# Patient Record
Sex: FEMALE | URBAN - METROPOLITAN AREA
[De-identification: names, ages, dates, MRNs, and addresses within clinical notes are randomized per-mention and may not be internally consistent; named-entity substitution may affect disease eponyms.]

---

## 2020-09-18 ENCOUNTER — OFFICE VISIT (OUTPATIENT)
Dept: URBAN - METROPOLITAN AREA CLINIC 76 | Facility: CLINIC | Age: 60
End: 2020-09-18
Payer: COMMERCIAL

## 2020-09-18 DIAGNOSIS — H04.123 DRY EYE SYNDROME OF BILATERAL LACRIMAL GLANDS: ICD-10-CM

## 2020-09-18 DIAGNOSIS — H25.13 AGE-RELATED NUCLEAR CATARACT, BILATERAL: ICD-10-CM

## 2020-09-18 DIAGNOSIS — H52.4 PRESBYOPIA: ICD-10-CM

## 2020-09-18 DIAGNOSIS — H43.813 VITREOUS DEGENERATION, BILATERAL: Primary | ICD-10-CM

## 2020-09-18 DIAGNOSIS — H10.45 OTHER CHRONIC ALLERGIC CONJUNCTIVITIS: ICD-10-CM

## 2020-09-18 PROCEDURE — 99204 OFFICE O/P NEW MOD 45 MIN: CPT | Performed by: OPTOMETRIST

## 2020-09-18 ASSESSMENT — INTRAOCULAR PRESSURE
OS: 15
OD: 14

## 2020-09-18 ASSESSMENT — KERATOMETRY
OD: 43.25
OS: 43.63

## 2020-09-18 ASSESSMENT — VISUAL ACUITY
OD: 20/25
OS: 20/30

## 2020-09-18 NOTE — IMPRESSION/PLAN
Impression: Vitreous degeneration, bilateral: H43.813. Bilateral. Plan: Posterior vitreous detachment accounts for the patient's complaints. There is no evidence of retinal pathology. All signs and risks of retinal detachment and tears were discussed in detail. Patient instructed to call the office immediately if any symptoms noted. No further treatment required, unless signs/symptoms of retinal detachment develop.